# Patient Record
Sex: FEMALE | Race: ASIAN | NOT HISPANIC OR LATINO | Employment: FULL TIME | URBAN - METROPOLITAN AREA
[De-identification: names, ages, dates, MRNs, and addresses within clinical notes are randomized per-mention and may not be internally consistent; named-entity substitution may affect disease eponyms.]

---

## 2017-12-18 ENCOUNTER — WALK IN (OUTPATIENT)
Dept: URGENT CARE | Age: 32
End: 2017-12-18

## 2017-12-18 VITALS
HEIGHT: 65 IN | OXYGEN SATURATION: 99 % | WEIGHT: 165 LBS | RESPIRATION RATE: 16 BRPM | TEMPERATURE: 98.7 F | BODY MASS INDEX: 27.49 KG/M2 | DIASTOLIC BLOOD PRESSURE: 72 MMHG | SYSTOLIC BLOOD PRESSURE: 116 MMHG | HEART RATE: 62 BPM

## 2017-12-18 DIAGNOSIS — H60.331 ACUTE SWIMMER'S EAR OF RIGHT SIDE: Primary | ICD-10-CM

## 2017-12-18 PROCEDURE — 99213 OFFICE O/P EST LOW 20 MIN: CPT | Performed by: PHYSICIAN ASSISTANT

## 2017-12-18 RX ORDER — OFLOXACIN 3 MG/ML
10 SOLUTION AURICULAR (OTIC) DAILY
Qty: 10 ML | Refills: 0 | Status: SHIPPED | OUTPATIENT
Start: 2017-12-18 | End: 2017-12-25

## 2017-12-18 ASSESSMENT — ENCOUNTER SYMPTOMS
FEVER: 0
CHILLS: 0
COUGH: 0
RHINORRHEA: 1
SORE THROAT: 0

## 2024-11-04 ENCOUNTER — OFFICE VISIT (OUTPATIENT)
Dept: FAMILY MEDICINE CLINIC | Age: 39
End: 2024-11-04
Payer: COMMERCIAL

## 2024-11-04 VITALS
OXYGEN SATURATION: 98 % | HEIGHT: 65 IN | DIASTOLIC BLOOD PRESSURE: 77 MMHG | HEART RATE: 64 BPM | SYSTOLIC BLOOD PRESSURE: 117 MMHG | WEIGHT: 176.6 LBS | TEMPERATURE: 98.2 F | BODY MASS INDEX: 29.42 KG/M2

## 2024-11-04 DIAGNOSIS — L30.9 ECZEMA OF LOWER LEG: Primary | ICD-10-CM

## 2024-11-04 DIAGNOSIS — Z11.59 NEED FOR HEPATITIS C SCREENING TEST: ICD-10-CM

## 2024-11-04 DIAGNOSIS — J30.2 SEASONAL ALLERGIES: ICD-10-CM

## 2024-11-04 DIAGNOSIS — R79.89 ABNORMAL THYROID BLOOD TEST: ICD-10-CM

## 2024-11-04 DIAGNOSIS — Z13.220 LIPID SCREENING: ICD-10-CM

## 2024-11-04 DIAGNOSIS — Z01.419 WELL FEMALE EXAM WITH ROUTINE GYNECOLOGICAL EXAM: ICD-10-CM

## 2024-11-04 PROBLEM — J30.89 ALLERGIC RHINITIS DUE TO AMERICAN HOUSE DUST MITE: Status: ACTIVE | Noted: 2022-12-05

## 2024-11-04 PROBLEM — R09.82 PND (POST-NASAL DRIP): Status: ACTIVE | Noted: 2022-12-05

## 2024-11-04 PROCEDURE — 90471 IMMUNIZATION ADMIN: CPT | Performed by: INTERNAL MEDICINE

## 2024-11-04 PROCEDURE — 90480 ADMN SARSCOV2 VAC 1/ONLY CMP: CPT | Performed by: INTERNAL MEDICINE

## 2024-11-04 PROCEDURE — 99204 OFFICE O/P NEW MOD 45 MIN: CPT | Performed by: INTERNAL MEDICINE

## 2024-11-04 PROCEDURE — 90656 IIV3 VACC NO PRSV 0.5 ML IM: CPT | Performed by: INTERNAL MEDICINE

## 2024-11-04 PROCEDURE — 91320 SARSCV2 VAC 30MCG TRS-SUC IM: CPT | Performed by: INTERNAL MEDICINE

## 2024-11-04 RX ORDER — NORGESTIMATE AND ETHINYL ESTRADIOL 0.25-0.035
1 KIT ORAL DAILY
COMMUNITY
Start: 2024-08-17 | End: 2024-11-05 | Stop reason: SDUPTHER

## 2024-11-04 NOTE — PROGRESS NOTES
"Chief Complaint  Establish Care (Would like her thyroid checked ), Annual Exam, and Med Refill (Needs refill on birth control; she's currently on last refill )    Subjective   Noé Banda is a 39 y.o. female who presents to [facility]     Patient Care Team:  Indy Mueller MD as PCP - General (Internal Medicine)  Patient presents to establish care.  She has a history of recent weight gain and hair loss and her thyroid is being monitored but she does not have a diagnosis of thyroid disorder.  Last annual pelvic pap was less than 3 years. No history of abnormal pap.  No children. Employed with [employer] position. Had COVID  in 2022.  Does use oral contraceptive therapy.  Today she is concerned about  eczema that has appeared on her left ankle.   She would also like her thyroid hormone levels checked as she has had abnormal value in the past which resolved without treatment.  She denies history of abnormal pap or pelvic exam.  Denies any recent illnesses or hospitalizations.    Review of Systems  Full review of systems obtained and pertinent positives states in above HPI.  Otherwise all others reviewed and are negative.    Objective   Vital Signs:   Vitals:    11/04/24 0902   BP: 117/77   BP Location: Left arm   Patient Position: Sitting   Cuff Size: Adult   Pulse: 64   Temp: 98.2 °F (36.8 °C)   TempSrc: Oral   SpO2: 98%   Weight: 80.1 kg (176 lb 9.6 oz)   Height: 165.1 cm (65\")     Body mass index is 29.39 kg/m².    Wt Readings from Last 3 Encounters:   11/04/24 80.1 kg (176 lb 9.6 oz)     BP Readings from Last 3 Encounters:   11/04/24 117/77       Health Maintenance   Topic Date Due    BMI FOLLOWUP  Never done    INFLUENZA VACCINE  08/01/2024    COVID-19 Vaccine (6 - 2024-25 season) 09/01/2024    HEPATITIS C SCREENING  Never done    ANNUAL PHYSICAL  Never done    PAP SMEAR  Never done    TDAP/TD VACCINES (4 - Td or Tdap) 08/29/2033    Pneumococcal Vaccine 0-64  Aged " Out       Physical Exam   GEN:NAD, well nourished  HEENT:NCAT, PERRL, EOMI, OP clear, MMM  NECK:supple, no JVD, no carotid bruits  CVA:RRR s1, s2 no m/r/g  CHEST:CTA B no wheezes or rhonchi  ABD:soft, nontender, nondistended +bs  EXT:no c/c/e 2+PP B  NEURO:CN II-XII grossly nonfocal, no evidence of asterixes or tremor  PSYCH: appropriate mood and affect  :deferred  SKIN: eczema patch left lateral akle    Result Review   The following data was reviewed by: Indy Mueller MD on 11/04/2024:  [x]  Tests & Results  []  Hospitalization/Emergency Department/Urgent Care  [x]  Internal/External Consultant Notes    Procedures          ASSESSMENT/PLAN  Diagnoses and all orders for this visit:    1. Eczema of lower leg (Primary)  Comments:  hydrocortisone cream 1% bid to affected area x 14 days  refer to dermatology  Orders:  -     Comprehensive metabolic panel  -     CBC w AUTO Differential  -     Ambulatory Referral to Dermatology    2. Seasonal allergies    3. Well female exam with routine gynecological exam  Comments:  refer to ob gyn  Orders:  -     Ambulatory Referral to Obstetrics / Gynecology    4. Need for hepatitis C screening test  Comments:  check hepatitis C ab  Orders:  -     Hepatitis C antibody    5. Abnormal thyroid blood test  Comments:  check tsh and free t4  Orders:  -     TSH Rfx On Abnormal To Free T4  -     T4, Free    6. Lipid screening  Comments:  check lipid profile  Orders:  -     Lipid panel    Other orders  -     Fluzone >6mos ([id number])  -     COVID-19 (Pfizer) 12yrs+ (COMIRNATY)  -     hydrocortisone 1 % cream; Apply 1 Application topically to the appropriate area as directed 2 (Two) Times a Day.  Dispense: 28 g; Refill: 0        BMI is >= 25 and <30. (Overweight) The following options were offered after discussion;: exercise counseling/recommendations and nutrition counseling/recommendations       Noé Banda  reports that she has never smoked. She has never used smokeless tobacco.               FOLLOW UP  No follow-ups on file.1/3/2025  Patient was given instructions and counseling regarding her condition or for health maintenance advice. Please see specific information pulled into the AVS if appropriate.       Indy Mueller MD  11/04/24  09:26 EST

## 2024-11-05 ENCOUNTER — LAB (OUTPATIENT)
Dept: LAB | Facility: HOSPITAL | Age: 39
End: 2024-11-05
Payer: COMMERCIAL

## 2024-11-05 ENCOUNTER — TELEPHONE (OUTPATIENT)
Dept: FAMILY MEDICINE CLINIC | Age: 39
End: 2024-11-05
Payer: COMMERCIAL

## 2024-11-05 LAB
ALBUMIN SERPL-MCNC: 3.7 G/DL (ref 3.5–5.2)
ALBUMIN/GLOB SERPL: 1.1 G/DL
ALP SERPL-CCNC: 55 U/L (ref 39–117)
ALT SERPL W P-5'-P-CCNC: 13 U/L (ref 1–33)
ANION GAP SERPL CALCULATED.3IONS-SCNC: 10.3 MMOL/L (ref 5–15)
AST SERPL-CCNC: 18 U/L (ref 1–32)
BASOPHILS # BLD AUTO: 0.02 10*3/MM3 (ref 0–0.2)
BASOPHILS NFR BLD AUTO: 0.2 % (ref 0–1.5)
BILIRUB SERPL-MCNC: 0.5 MG/DL (ref 0–1.2)
BUN SERPL-MCNC: 11 MG/DL (ref 6–20)
BUN/CREAT SERPL: 13.1 (ref 7–25)
CALCIUM SPEC-SCNC: 8.7 MG/DL (ref 8.6–10.5)
CHLORIDE SERPL-SCNC: 103 MMOL/L (ref 98–107)
CHOLEST SERPL-MCNC: 155 MG/DL (ref 0–200)
CO2 SERPL-SCNC: 25.7 MMOL/L (ref 22–29)
CREAT SERPL-MCNC: 0.84 MG/DL (ref 0.57–1)
DEPRECATED RDW RBC AUTO: 38.1 FL (ref 37–54)
EGFRCR SERPLBLD CKD-EPI 2021: 90.8 ML/MIN/1.73
EOSINOPHIL # BLD AUTO: 0.24 10*3/MM3 (ref 0–0.4)
EOSINOPHIL NFR BLD AUTO: 2.9 % (ref 0.3–6.2)
ERYTHROCYTE [DISTWIDTH] IN BLOOD BY AUTOMATED COUNT: 11.6 % (ref 12.3–15.4)
GLOBULIN UR ELPH-MCNC: 3.4 GM/DL
GLUCOSE SERPL-MCNC: 87 MG/DL (ref 65–99)
HCT VFR BLD AUTO: 39.1 % (ref 34–46.6)
HCV AB SER QL: NORMAL
HDLC SERPL-MCNC: 47 MG/DL (ref 40–60)
HGB BLD-MCNC: 12.9 G/DL (ref 12–15.9)
IMM GRANULOCYTES # BLD AUTO: 0.01 10*3/MM3 (ref 0–0.05)
IMM GRANULOCYTES NFR BLD AUTO: 0.1 % (ref 0–0.5)
LDLC SERPL CALC-MCNC: 82 MG/DL (ref 0–100)
LDLC/HDLC SERPL: 1.66 {RATIO}
LYMPHOCYTES # BLD AUTO: 2.05 10*3/MM3 (ref 0.7–3.1)
LYMPHOCYTES NFR BLD AUTO: 24.7 % (ref 19.6–45.3)
MCH RBC QN AUTO: 29.5 PG (ref 26.6–33)
MCHC RBC AUTO-ENTMCNC: 33 G/DL (ref 31.5–35.7)
MCV RBC AUTO: 89.3 FL (ref 79–97)
MONOCYTES # BLD AUTO: 0.7 10*3/MM3 (ref 0.1–0.9)
MONOCYTES NFR BLD AUTO: 8.4 % (ref 5–12)
NEUTROPHILS NFR BLD AUTO: 5.28 10*3/MM3 (ref 1.7–7)
NEUTROPHILS NFR BLD AUTO: 63.7 % (ref 42.7–76)
PLATELET # BLD AUTO: 296 10*3/MM3 (ref 140–450)
PMV BLD AUTO: 9.1 FL (ref 6–12)
POTASSIUM SERPL-SCNC: 4.4 MMOL/L (ref 3.5–5.2)
PROT SERPL-MCNC: 7.1 G/DL (ref 6–8.5)
RBC # BLD AUTO: 4.38 10*6/MM3 (ref 3.77–5.28)
SODIUM SERPL-SCNC: 139 MMOL/L (ref 136–145)
T4 FREE SERPL-MCNC: 0.82 NG/DL (ref 0.92–1.68)
TRIGL SERPL-MCNC: 151 MG/DL (ref 0–150)
TSH SERPL DL<=0.05 MIU/L-ACNC: 6.16 UIU/ML (ref 0.27–4.2)
VLDLC SERPL-MCNC: 26 MG/DL (ref 5–40)
WBC NRBC COR # BLD AUTO: 8.3 10*3/MM3 (ref 3.4–10.8)

## 2024-11-05 PROCEDURE — 80050 GENERAL HEALTH PANEL: CPT | Performed by: INTERNAL MEDICINE

## 2024-11-05 PROCEDURE — 80061 LIPID PANEL: CPT | Performed by: INTERNAL MEDICINE

## 2024-11-05 PROCEDURE — 36415 COLL VENOUS BLD VENIPUNCTURE: CPT | Performed by: INTERNAL MEDICINE

## 2024-11-05 PROCEDURE — 86803 HEPATITIS C AB TEST: CPT | Performed by: INTERNAL MEDICINE

## 2024-11-05 PROCEDURE — 84439 ASSAY OF FREE THYROXINE: CPT | Performed by: INTERNAL MEDICINE

## 2024-11-05 RX ORDER — BENZOCAINE/MENTHOL 6 MG-10 MG
1 LOZENGE MUCOUS MEMBRANE 2 TIMES DAILY
Qty: 28.4 G | Refills: 0 | Status: SHIPPED | OUTPATIENT
Start: 2024-11-05

## 2024-11-05 RX ORDER — NORGESTIMATE AND ETHINYL ESTRADIOL 0.25-0.035
1 KIT ORAL DAILY
Qty: 28 TABLET | Refills: 5 | Status: SHIPPED | OUTPATIENT
Start: 2024-11-05

## 2024-11-05 NOTE — TELEPHONE ENCOUNTER
Patient came in asking about birth control pill medication. Yesterday patient was seen with us and the provider stated she would send in patient's birth control pill. Patient is out. Can this medication be sent in?  Pharmacy- [facility] next door.

## 2024-11-13 DIAGNOSIS — E03.9 HYPOTHYROIDISM, UNSPECIFIED TYPE: Primary | ICD-10-CM

## 2024-11-13 RX ORDER — LEVOTHYROXINE SODIUM 25 UG/1
25 TABLET ORAL DAILY
Qty: 30 TABLET | Refills: 5 | Status: SHIPPED | OUTPATIENT
Start: 2024-11-13

## 2025-05-05 ENCOUNTER — LAB (OUTPATIENT)
Dept: LAB | Facility: HOSPITAL | Age: 40
End: 2025-05-05
Payer: COMMERCIAL

## 2025-05-05 ENCOUNTER — OFFICE VISIT (OUTPATIENT)
Dept: FAMILY MEDICINE CLINIC | Age: 40
End: 2025-05-05
Payer: COMMERCIAL

## 2025-05-05 VITALS
BODY MASS INDEX: 29.49 KG/M2 | WEIGHT: 177 LBS | HEIGHT: 65 IN | OXYGEN SATURATION: 100 % | TEMPERATURE: 98.2 F | HEART RATE: 58 BPM | DIASTOLIC BLOOD PRESSURE: 81 MMHG | SYSTOLIC BLOOD PRESSURE: 112 MMHG

## 2025-05-05 DIAGNOSIS — Z00.00 ANNUAL PHYSICAL EXAM: ICD-10-CM

## 2025-05-05 DIAGNOSIS — Z13.1 SCREENING FOR DIABETES MELLITUS (DM): ICD-10-CM

## 2025-05-05 DIAGNOSIS — E03.9 HYPOTHYROIDISM, UNSPECIFIED TYPE: Primary | ICD-10-CM

## 2025-05-05 DIAGNOSIS — E03.9 HYPOTHYROIDISM, UNSPECIFIED TYPE: ICD-10-CM

## 2025-05-05 PROBLEM — J02.9 SORE THROAT: Status: RESOLVED | Noted: 2022-12-05 | Resolved: 2025-05-05

## 2025-05-05 LAB
HBA1C MFR BLD: 5.4 % (ref 4.8–5.6)
T3FREE SERPL-MCNC: 3.33 PG/ML (ref 2–4.4)
T4 FREE SERPL-MCNC: 1.09 NG/DL (ref 0.92–1.68)
TSH SERPL DL<=0.05 MIU/L-ACNC: 4.73 UIU/ML (ref 0.27–4.2)

## 2025-05-05 PROCEDURE — 84439 ASSAY OF FREE THYROXINE: CPT

## 2025-05-05 PROCEDURE — 86376 MICROSOMAL ANTIBODY EACH: CPT

## 2025-05-05 PROCEDURE — 86800 THYROGLOBULIN ANTIBODY: CPT

## 2025-05-05 PROCEDURE — 84443 ASSAY THYROID STIM HORMONE: CPT

## 2025-05-05 PROCEDURE — 84481 FREE ASSAY (FT-3): CPT

## 2025-05-05 PROCEDURE — 83036 HEMOGLOBIN GLYCOSYLATED A1C: CPT

## 2025-05-05 PROCEDURE — 36415 COLL VENOUS BLD VENIPUNCTURE: CPT

## 2025-05-05 RX ORDER — BENZOCAINE/MENTHOL 6 MG-10 MG
1 LOZENGE MUCOUS MEMBRANE AS NEEDED
Status: SHIPPED | COMMUNITY
Start: 2025-05-05

## 2025-05-05 NOTE — PROGRESS NOTES
Chief Complaint  Eczema, Allergies, and Annual Exam    Subjective      Noé Banda is a 40 y.o. female who presents to [facility]     History of Present Illness  The patient is a 40-year-old female who presents today for follow-up regarding eczema, allergies, and known hypothyroidism.    She reports an improvement in her condition, attributing it to the effectiveness of her current medication. She has observed a significant increase in her energy levels, particularly in the morning. Her thyroid-stimulating hormone (TSH) levels have consistently been elevated, ranging between 4 and 5. She has not experienced any edema in her feet and has been supplementing her diet with iodine.    She underwent a baseline mammogram, which revealed dense tissue in her right breast. She was advised to return for a follow-up scan and expresses concern about the necessity of this follow-up and whether it will be covered by her insurance as her initial cost was quite high at over $800.    Her family history includes her mother having her thyroid surgically removed and her grandmother having had throat cancer.    FAMILY HISTORY  Her mother had her thyroid surgically removed around age 39.  Her grandmother had throat cancer and passed away from it.        Patient Care Team:  Indy Mueller MD as PCP - General (Internal Medicine)  Blanka Ryan APRN (Nurse Practitioner)  Answers submitted by the patient for this visit:  Problem not listed (Submitted on 5/1/2025)  Chief Complaint: Other medical problem  Reason for appointment: Follow up visit and possible lab work  abdominal pain: No  anorexia: No  joint pain: No  change in stool: No  chest pain: No  chills: No  nasal congestion: No  cough: No  diaphoresis: No  fatigue: No  fever: No  headaches: No  joint swelling: No  myalgias: No  nausea: No  neck pain: No  numbness: No  rash: No  sore throat: No  swollen glands: No  dysuria: No  vertigo: No  visual  "change: No  vomiting: No  weakness: No  Onset: more than 5 years  Chronicity: recurrent  Frequency: intermittently  Medications tried: levothyroxine  Additional information: elevated thyroid levels    Objective   Vital Signs:   Vitals:    05/05/25 0840   BP: 112/81   BP Location: Left arm   Patient Position: Sitting   Cuff Size: Adult   Pulse: 58   Temp: 98.2 °F (36.8 °C)   TempSrc: Oral   SpO2: 100%   Weight: 80.3 kg (177 lb)   Height: 165.1 cm (65\")     Body mass index is 29.45 kg/m².    Wt Readings from Last 3 Encounters:   05/05/25 80.3 kg (177 lb)   11/04/24 80.1 kg (176 lb 9.6 oz)     BP Readings from Last 3 Encounters:   05/05/25 112/81   11/04/24 117/77       Health Maintenance   Topic Date Due    INFLUENZA VACCINE  07/01/2025    ANNUAL PHYSICAL  05/05/2026    MAMMOGRAM  03/03/2027    PAP SMEAR  01/17/2028    TDAP/TD VACCINES (4 - Td or Tdap) 08/29/2033    HEPATITIS C SCREENING  Completed    COVID-19 Vaccine  Completed    Pneumococcal Vaccine 0-49  Aged Out       Physical Exam  Constitutional:       Appearance: Normal appearance. She is normal weight.   HENT:      Head: Normocephalic and atraumatic.      Right Ear: External ear normal.      Left Ear: External ear normal.      Nose: Nose normal.      Mouth/Throat:      Pharynx: Oropharynx is clear. No posterior oropharyngeal erythema.   Eyes:      General: No scleral icterus.     Extraocular Movements: Extraocular movements intact.      Conjunctiva/sclera: Conjunctivae normal.      Pupils: Pupils are equal, round, and reactive to light.   Neck:      Thyroid: Thyromegaly present.      Vascular: No carotid bruit.   Cardiovascular:      Rate and Rhythm: Normal rate and regular rhythm.      Pulses: Normal pulses.      Heart sounds: Normal heart sounds. No murmur heard.     No friction rub. No gallop.   Pulmonary:      Effort: Pulmonary effort is normal.      Breath sounds: Normal breath sounds. No wheezing or rhonchi.   Abdominal:      General: Bowel sounds are " normal. There is no distension.      Palpations: Abdomen is soft.      Tenderness: There is no guarding or rebound.   Musculoskeletal:         General: No swelling. Normal range of motion.      Cervical back: Normal range of motion and neck supple.      Right lower leg: No edema.      Left lower leg: No edema.   Lymphadenopathy:      Cervical: No cervical adenopathy.   Skin:     General: Skin is warm and dry.      Findings: No rash.   Neurological:      Mental Status: She is alert and oriented to person, place, and time. Mental status is at baseline.      Cranial Nerves: No cranial nerve deficit.      Motor: No weakness.   Psychiatric:         Mood and Affect: Mood normal.         Judgment: Judgment normal.          Physical Exam  Neck: Prominent isthmus noted, no lumps or bumps palpated.  Respiratory: Clear to auscultation, no wheezing, rales or rhonchi  Extremities: No edema      Result Review   The following data was reviewed by: Indy Mueller MD on 05/05/2025:  [x]  Tests & Results  []  Hospitalization/Emergency Department/Urgent Care  [x]  Internal/External Consultant Notes    Results  No visits with results within 1 Month(s) from this visit.   Latest known visit with results is:   Office Visit on 11/04/2024   Component Date Value    Total Cholesterol 11/05/2024 155     Triglycerides 11/05/2024 151 (H)     HDL Cholesterol 11/05/2024 47     LDL Cholesterol  11/05/2024 82     VLDL Cholesterol 11/05/2024 26     LDL/HDL Ratio 11/05/2024 1.66     Glucose 11/05/2024 87     BUN 11/05/2024 11     Creatinine 11/05/2024 0.84     Sodium 11/05/2024 139     Potassium 11/05/2024 4.4     Chloride 11/05/2024 103     CO2 11/05/2024 25.7     Calcium 11/05/2024 8.7     Total Protein 11/05/2024 7.1     Albumin 11/05/2024 3.7     ALT (SGPT) 11/05/2024 13     AST (SGOT) 11/05/2024 18     Alkaline Phosphatase 11/05/2024 55     Total Bilirubin 11/05/2024 0.5     Globulin 11/05/2024 3.4     A/G Ratio 11/05/2024 1.1      BUN/Creatinine Ratio 11/05/2024 13.1     Anion Gap 11/05/2024 10.3     eGFR 11/05/2024 90.8     TSH 11/05/2024 6.160 (H)     Hepatitis C Ab 11/05/2024 Non-Reactive     WBC 11/05/2024 8.30     RBC 11/05/2024 4.38     Hemoglobin 11/05/2024 12.9     Hematocrit 11/05/2024 39.1     MCV 11/05/2024 89.3     MCH 11/05/2024 29.5     MCHC 11/05/2024 33.0     RDW 11/05/2024 11.6 (L)     RDW-SD 11/05/2024 38.1     MPV 11/05/2024 9.1     Platelets 11/05/2024 296     Neutrophil % 11/05/2024 63.7     Lymphocyte % 11/05/2024 24.7     Monocyte % 11/05/2024 8.4     Eosinophil % 11/05/2024 2.9     Basophil % 11/05/2024 0.2     Immature Grans % 11/05/2024 0.1     Neutrophils, Absolute 11/05/2024 5.28     Lymphocytes, Absolute 11/05/2024 2.05     Monocytes, Absolute 11/05/2024 0.70     Eosinophils, Absolute 11/05/2024 0.24     Basophils, Absolute 11/05/2024 0.02     Immature Grans, Absolute 11/05/2024 0.01     Free T4 11/05/2024 0.82 (L)    Labs   - TSH: 4 and 5   - Hemoglobin A1c: 2023, 5.2    No visits with results within 1 Month(s) from this visit.   Latest known visit with results is:   Office Visit on 11/04/2024   Component Date Value    Total Cholesterol 11/05/2024 155     Triglycerides 11/05/2024 151 (H)     HDL Cholesterol 11/05/2024 47     LDL Cholesterol  11/05/2024 82     VLDL Cholesterol 11/05/2024 26     LDL/HDL Ratio 11/05/2024 1.66     Glucose 11/05/2024 87     BUN 11/05/2024 11     Creatinine 11/05/2024 0.84     Sodium 11/05/2024 139     Potassium 11/05/2024 4.4     Chloride 11/05/2024 103     CO2 11/05/2024 25.7     Calcium 11/05/2024 8.7     Total Protein 11/05/2024 7.1     Albumin 11/05/2024 3.7     ALT (SGPT) 11/05/2024 13     AST (SGOT) 11/05/2024 18     Alkaline Phosphatase 11/05/2024 55     Total Bilirubin 11/05/2024 0.5     Globulin 11/05/2024 3.4     A/G Ratio 11/05/2024 1.1     BUN/Creatinine Ratio 11/05/2024 13.1     Anion Gap 11/05/2024 10.3     eGFR 11/05/2024 90.8     TSH 11/05/2024 6.160 (H)     Hepatitis  C Ab 11/05/2024 Non-Reactive     WBC 11/05/2024 8.30     RBC 11/05/2024 4.38     Hemoglobin 11/05/2024 12.9     Hematocrit 11/05/2024 39.1     MCV 11/05/2024 89.3     MCH 11/05/2024 29.5     MCHC 11/05/2024 33.0     RDW 11/05/2024 11.6 (L)     RDW-SD 11/05/2024 38.1     MPV 11/05/2024 9.1     Platelets 11/05/2024 296     Neutrophil % 11/05/2024 63.7     Lymphocyte % 11/05/2024 24.7     Monocyte % 11/05/2024 8.4     Eosinophil % 11/05/2024 2.9     Basophil % 11/05/2024 0.2     Immature Grans % 11/05/2024 0.1     Neutrophils, Absolute 11/05/2024 5.28     Lymphocytes, Absolute 11/05/2024 2.05     Monocytes, Absolute 11/05/2024 0.70     Eosinophils, Absolute 11/05/2024 0.24     Basophils, Absolute 11/05/2024 0.02     Immature Grans, Absolute 11/05/2024 0.01     Free T4 11/05/2024 0.82 (L)        Procedures          ASSESSMENT/PLAN  Assessment & Plan  Annual physical exam  CMP and CBC check 11/5/24 nl,   Recheck thyroid hormone levels  Pap smear done 1/2025, followed by Blanka Ryan with OBGYN  Mammogram done3/3/2025 with requests for       Hypothyroidism, unspecified type    Orders:    TSH+Free T4; Future    T3, free; Future    Thyroglobulin Antibody; Future    Thyroid Peroxidase Antibody; Future    US Thyroid    Screening for diabetes mellitus (DM)    Orders:    Hemoglobin A1c; Future        Assessment & Plan  1. Hypothyroidism.  - Reports that her current medication is effective, with noticeable improvement in energy levels, particularly in the morning.  - Physical examination reveals no swelling in the feet and a prominent isthmus, which is a normal variant.  - Comprehensive thyroid panel ordered, including TSH, free T4, T3, thyroglobulin antibodies, and thyroid peroxidase antibody to check for Hashimoto's thyroiditis.  - Ultrasound of the thyroid will be conducted due to family history of thyroid issues and to rule out any abnormalities.    2. Health Maintenance.  - Blood pressure readings are within the normal  range.  - Hemoglobin A1c level was 5.2 in 2023, which is normal; no recent data available for the past 2 years.  - Advised to undergo an ultrasound of the right breast due to dense tissue noted in the baseline mammogram; annual mammogram recommended.  - Hemoglobin A1c test ordered to monitor blood sugar levels over the past 3 months.    3.  Annual physical  - Patient screenings are up-to-date including pelvic and Pap.  - Labs reviewed.  They are within normal limits.  Will need to check TSH free T4 T3 and hemoglobin A1c today          FOLLOW UP  Return in about 27 weeks (around 11/10/2025).  Patient was given instructions and counseling regarding her condition or for health maintenance advice. Please see specific information pulled into the AVS if appropriate.       Patient or patient representative verbalized consent for the use of Ambient Listening during the visit with  Indy Mueller MD for chart documentation. 5/5/2025  09:15 EDT    Please note that portions of this note were completed with a voice recognition program.      Indy Mueller MD  05/05/25  09:44 EDT

## 2025-05-07 LAB
THYROGLOB AB SERPL-ACNC: 3.2 IU/ML (ref 0–0.9)
THYROPEROXIDASE AB SERPL-ACNC: 43 IU/ML (ref 0–34)

## 2025-05-29 ENCOUNTER — RESULTS FOLLOW-UP (OUTPATIENT)
Dept: FAMILY MEDICINE CLINIC | Age: 40
End: 2025-05-29
Payer: COMMERCIAL

## 2025-06-02 RX ORDER — LEVOTHYROXINE SODIUM 25 UG/1
25 TABLET ORAL DAILY
Qty: 30 TABLET | Refills: 5 | Status: SHIPPED | OUTPATIENT
Start: 2025-06-02

## 2025-07-22 RX ORDER — NORGESTIMATE AND ETHINYL ESTRADIOL 0.25-0.035
1 KIT ORAL DAILY
Qty: 28 TABLET | Refills: 5 | OUTPATIENT
Start: 2025-07-22

## 2025-07-25 RX ORDER — NORGESTIMATE AND ETHINYL ESTRADIOL 0.25-0.035
1 KIT ORAL DAILY
Qty: 28 TABLET | Refills: 5 | Status: CANCELLED | OUTPATIENT
Start: 2025-07-25

## 2025-07-25 NOTE — TELEPHONE ENCOUNTER
Spoke to pt, I advised her to contact [facility] and see if they will fill since she saw them in Jan 2025.  Pt will call me back if there are any issues.

## 2025-07-25 NOTE — TELEPHONE ENCOUNTER
"Caller: Noé Banda \"Shalonda\"    Relationship: Self    Best call back number: [phone]     Requested Prescriptions:   Requested Prescriptions     Pending Prescriptions Disp Refills    Sprintec 28 0.25-35 MG-MCG per tablet 28 tablet 5     Sig: Take 1 tablet by mouth Daily.        Pharmacy where request should be sent: [facility]     Last office visit with prescribing clinician: 5/5/2025   Last telemedicine visit with prescribing clinician: Visit date not found   Next office visit with prescribing clinician: Visit date not found     Additional details provided by patient:     Does the patient have less than a 3 day supply:  [x] Yes  [] No    Would you like a call back once the refill request has been completed: [] Yes [] No    If the office needs to give you a call back, can they leave a voicemail: [] Yes [] No    John Chappell Rep   07/25/25 11:58 EDT   "